# Patient Record
Sex: FEMALE | Race: WHITE | NOT HISPANIC OR LATINO | URBAN - METROPOLITAN AREA
[De-identification: names, ages, dates, MRNs, and addresses within clinical notes are randomized per-mention and may not be internally consistent; named-entity substitution may affect disease eponyms.]

---

## 2022-10-28 ENCOUNTER — TELEPHONE (OUTPATIENT)
Dept: OPHTHALMOLOGY | Facility: CLINIC | Age: 62
End: 2022-10-28

## 2022-10-28 NOTE — TELEPHONE ENCOUNTER
LVM for patient regarding scheduling with  for : Blepharoplasty-Per  Dr.Spencer López. Provided [facility] number for scheduling and also provided  as an option in [facility] that might be closer to home for patient.

## 2022-10-31 ENCOUNTER — TELEPHONE (OUTPATIENT)
Dept: OPHTHALMOLOGY | Facility: CLINIC | Age: 62
End: 2022-10-31

## 2022-10-31 NOTE — TELEPHONE ENCOUNTER
LVM for patient again regarding scheduling with  for : Blepharoplasty-Per  Dr.Spencer López. Provided [facility] and direct number for scheduling. Also, provided  as an option in [facility] that might be closer to home for patient.

## 2022-11-02 NOTE — TELEPHONE ENCOUNTER
FUTURE VISIT INFORMATION      FUTURE VISIT INFORMATION:    Date: 2/6/23    Time: 1:15pm    Location: [facility] [place]  REFERRAL INFORMATION:    Referring provider:  Dr Jose Miguel López     [facility] providers clinic[facility]     Reason for visit/diagnosis  Blepharoplasty    RECORDS REQUESTED FROM:       Clinic name Comments Records Status Imaging Status   [facility]  Request for recs sent 11/2- Fax: [phone]- resent 12/7, 1/25

## 2023-02-06 ENCOUNTER — OFFICE VISIT (OUTPATIENT)
Dept: OPHTHALMOLOGY | Facility: CLINIC | Age: 63
End: 2023-02-06
Payer: COMMERCIAL

## 2023-02-06 ENCOUNTER — PRE VISIT (OUTPATIENT)
Dept: OPHTHALMOLOGY | Facility: CLINIC | Age: 63
End: 2023-02-06

## 2023-02-06 DIAGNOSIS — H02.849 EDEMA OF EYELID, UNSPECIFIED LATERALITY: Primary | ICD-10-CM

## 2023-02-06 PROCEDURE — 96999 UNLISTED SPEC DERM SVC/PX: CPT | Performed by: OPHTHALMOLOGY

## 2023-02-06 PROCEDURE — 92285 EXTERNAL OCULAR PHOTOGRAPHY: CPT | Mod: GC | Performed by: OPHTHALMOLOGY

## 2023-02-06 PROCEDURE — 99203 OFFICE O/P NEW LOW 30 MIN: CPT | Mod: GC | Performed by: OPHTHALMOLOGY

## 2023-02-06 PROCEDURE — 92081 LIMITED VISUAL FIELD XM: CPT | Mod: GC | Performed by: OPHTHALMOLOGY

## 2023-02-06 RX ORDER — AMOXICILLIN 500 MG/1
CAPSULE ORAL
COMMUNITY
Start: 2023-01-25

## 2023-02-06 RX ORDER — CITALOPRAM HYDROBROMIDE 40 MG/1
40 TABLET ORAL
COMMUNITY
Start: 2022-04-05

## 2023-02-06 ASSESSMENT — VISUAL ACUITY
METHOD: SNELLEN - LINEAR
OS_SC: 20/25
OD_SC: 20/25
OS_SC+: -2

## 2023-02-06 ASSESSMENT — CONF VISUAL FIELD
OS_SUPERIOR_TEMPORAL_RESTRICTION: 0
OS_SUPERIOR_NASAL_RESTRICTION: 0
OD_SUPERIOR_TEMPORAL_RESTRICTION: 0
OD_INFERIOR_NASAL_RESTRICTION: 0
OD_INFERIOR_TEMPORAL_RESTRICTION: 0
OS_INFERIOR_NASAL_RESTRICTION: 0
OS_INFERIOR_TEMPORAL_RESTRICTION: 0
OD_NORMAL: 1
OD_SUPERIOR_NASAL_RESTRICTION: 0
OS_NORMAL: 1

## 2023-02-06 ASSESSMENT — TONOMETRY
OD_IOP_MMHG: 12
IOP_METHOD: ICARE
OS_IOP_MMHG: 10

## 2023-02-06 ASSESSMENT — EXTERNAL EXAM - RIGHT EYE: OD_EXAM: NORMAL

## 2023-02-06 NOTE — PROGRESS NOTES
Chief Complaints and History of Present Illnesses   Patient presents with     Droopy Eye Lid Evaluation     Chief Complaint(s) and History of Present Illness(es)     Droopy Eye Lid Evaluation    In right upper lid, right lower lid, left upper lid and left lower lid.    Associated signs and symptoms include Negative for trauma, eye pain,   redness, tearing and abnormal gait.  Pain was noted as 0/10.           Comments    Patient present regarding drooping lids each eye. Patient states lower   lids bother her more. Patient has tried many over the counter lotions and   micro needling and nothing seems to help.   Patient states warm and cold does nothing to aid with drooping.   LEIGHA Gould February 6, 2023 1:13 PM            pt referred for lower lid swelling. She saw Dr. Jose Miguel López about 1.5 years ago for lower lid swelling and was treated with filler which she feels has not helped and if anything the swelling is now worse. She feels the swelling is worse at the end of the day.       FUNCTIONAL COMPLAINTS RELATED TO DROOPY EYELIDS/BROWS:  Ryan Wiggins describes upper lids interfering with superior visual field and interfering with activities of daily living including reading, driving and watching television.     EXAM:     MRD1: Right eye 3   Left eye 3  Brow ptosis with brow resting below superior orbital rim yes left     VISUAL FIELD:  Right eye untaped:15 degrees Right eye taped:45 degrees  Left eye untaped:20 degrees Left eye taped:45 degrees    Right eye visual field improves by: 30 degrees  Left eye visual field improves by: 25 degrees        Assessment & Plan     Ryan Wiggins is a 62 year old female with the following diagnoses:   1. Edema of eyelid, unspecified laterality         Bilateral lower lid edema  - likely related to filler, but could also be festoons underlying filler-related edema  - recommend hylenex to dissolve filler  - follow up in 3-4  weeks for re-evaluation with possible additional  hylenex injection              Stephanie Austin MD  Oculoplastics Fellow    Attending Physician Attestation:  I have seen and examined this patient with the fellow .  I have confirmed and edited as necessary the chief complaint(s), history of present illness, review of systems, relevant history, and examination findings as documented by others.  I have personally reviewed the relevant tests, images, and reports as documented above.  I have confirmed and edited as necessary the assessment and plan and agree with this note.    - Antelmo Lucia MD 2:08 PM 2/6/2023    Today with Ryan Wiggins, I reviewed the indications, risks, benefits, and alternatives of the proposed surgical procedure including, but not limited to, failure obtain the desired result  and need for additional surgery, bleeding, infection, loss of vision, loss of the eye, and the remote possibility of permanent damage to any organ system or death with the use of anesthesia.  I provided multiple opportunities for the questions, answered all questions to the best of my ability, and confirmed that my answers and my discussion were understood.     - Antelmo Lucia MD 2:09 PM 2/6/2023

## 2023-02-06 NOTE — NURSING NOTE
Chief Complaints and History of Present Illnesses   Patient presents with     Droopy Eye Lid Evaluation     Chief Complaint(s) and History of Present Illness(es)     Droopy Eye Lid Evaluation            Laterality: right upper lid, right lower lid, left upper lid and left lower lid    Associated signs and symptoms: Negative for trauma, eye pain, redness, tearing and abnormal gait    Pain scale: 0/10          Comments    Patient present regarding drooping lids each eye. Patient states lower lids bother her more. Patient has tried many over the counter lotions and micro needling and nothing seems to help.   Patient states warm and cold does nothing to aid with drooping.   Jennifer Magallanes, LEIGHA February 6, 2023 1:13 PM

## 2023-02-06 NOTE — LETTER
2023         RE:  :  MRN: Ryan Wiggins  1960  [id number]     Dear Dr. Pepper Gutierrez,    Thank you for asking me to see your patient, Ryan Wiggins, for an oculoplastic   consultation.  My assessment and plan are below.  For further details, please see my attached clinic note.      Assessment & Plan     Ryan Wiggins is a 62 year old female with the following diagnoses:   1. Edema of eyelid, unspecified laterality         Bilateral lower lid edema  - likely related to filler, but could also be festoons underlying filler-related edema  - recommend Hylenex to dissolve filler  - follow up in 3-4  weeks for re-evaluation with possible additional hylenex injection            Again, thank you for allowing me to participate in the care of your patient.      Sincerely,    Antelmo Lucia MD  Department of Ophthalmology and Visual Neurosciences  [facility]    CC: PEPPER GUTIERREZ  [address] [address]  Via Fax: [phone]

## 2023-02-12 ENCOUNTER — HEALTH MAINTENANCE LETTER (OUTPATIENT)
Age: 63
End: 2023-02-12

## 2023-03-06 ENCOUNTER — OFFICE VISIT (OUTPATIENT)
Dept: OPHTHALMOLOGY | Facility: CLINIC | Age: 63
End: 2023-03-06
Payer: COMMERCIAL

## 2023-03-06 DIAGNOSIS — H02.849 EDEMA OF EYELID, UNSPECIFIED LATERALITY: Primary | ICD-10-CM

## 2023-03-06 PROCEDURE — 99213 OFFICE O/P EST LOW 20 MIN: CPT | Mod: GC | Performed by: OPHTHALMOLOGY

## 2023-03-06 PROCEDURE — 96999 UNLISTED SPEC DERM SVC/PX: CPT | Performed by: OPHTHALMOLOGY

## 2023-03-06 ASSESSMENT — CONF VISUAL FIELD
OD_NORMAL: 1
OD_INFERIOR_NASAL_RESTRICTION: 0
OD_SUPERIOR_TEMPORAL_RESTRICTION: 0
OD_INFERIOR_TEMPORAL_RESTRICTION: 0
METHOD: COUNTING FINGERS
OS_NORMAL: 1
OS_SUPERIOR_TEMPORAL_RESTRICTION: 0
OS_INFERIOR_NASAL_RESTRICTION: 0
OD_SUPERIOR_NASAL_RESTRICTION: 0
OS_INFERIOR_TEMPORAL_RESTRICTION: 0
OS_SUPERIOR_NASAL_RESTRICTION: 0

## 2023-03-06 ASSESSMENT — VISUAL ACUITY
OS_SC+: -2
METHOD: SNELLEN - LINEAR
OD_SC: 20/20
OD_SC+: -1
OS_SC: 20/20

## 2023-03-06 ASSESSMENT — TONOMETRY
IOP_METHOD: ICARE
OD_IOP_MMHG: 12
OS_IOP_MMHG: 11

## 2023-03-06 ASSESSMENT — EXTERNAL EXAM - RIGHT EYE: OD_EXAM: NORMAL

## 2023-03-06 NOTE — PROGRESS NOTES
Chief Complaints and History of Present Illnesses   Patient presents with     Droopy Eye Lid Follow-Up     Chief Complaint(s) and History of Present Illness(es)     Droopy Eye Lid Follow-Up    In right upper lid, right lower lid, left upper lid and left lower lid.    Associated signs and symptoms include Negative for imbalance, difficulty   chewing, chin-up position and neck tilt.           Comments    Patient wants upper and lower lids done. Patient had filler done Feb 6th.   Patient states she sees no difference. Patient would like to move forward   with surgery if possible.  LEIGHA Gould March 6, 2023 12:50 PM                     Assessment & Plan     Ryan Wiggins is a 62 year old female with the following diagnoses:   1. Edema of eyelid, unspecified laterality         Bilateral lower lid edema  - likely related to filler, but could also be festoons underlying filler-related edema  - recommend repeat hylenex today as there is persistent filler  - follow up in 3-4  weeks for re-evaluation with possible additional hylenex injection          Stephanie Austin MD  Oculoplastics Fellow    Attending Physician Attestation:  I have seen and examined this patient with the fellow .  I have confirmed and edited as necessary the chief complaint(s), history of present illness, review of systems, relevant history, and examination findings as documented by others.  I have personally reviewed the relevant tests, images, and reports as documented above.  I have confirmed and edited as necessary the assessment and plan and agree with this note.    - Antelmo Lucia MD 1:23 PM 3/6/2023    Today with Ryan Wiggins, I reviewed the indications, risks, benefits, and alternatives of the proposed surgical procedure including, but not limited to, failure obtain the desired result  and need for additional surgery, bleeding, infection, loss of vision, loss of the eye, and the remote possibility of permanent damage to any organ  system or death with the use of anesthesia.  I provided multiple opportunities for the questions, answered all questions to the best of my ability, and confirmed that my answers and my discussion were understood.     - Antelmo Lucia MD 1:23 PM 3/6/2023

## 2023-03-06 NOTE — NURSING NOTE
Chief Complaints and History of Present Illnesses   Patient presents with     Droopy Eye Lid Follow-Up     Chief Complaint(s) and History of Present Illness(es)     Droopy Eye Lid Follow-Up            Laterality: right upper lid, right lower lid, left upper lid and left lower lid    Associated signs and symptoms: Negative for imbalance, difficulty chewing, chin-up position and neck tilt          Comments    Patient wants upper and lower lids done. Patient had filler done Feb 6th. Patient states she sees no difference. Patient would like to move forward with surgery if possible.  Jennifer Magallanes, LEIGHA March 6, 2023 12:50 PM

## 2023-03-27 ENCOUNTER — OFFICE VISIT (OUTPATIENT)
Dept: OPHTHALMOLOGY | Facility: CLINIC | Age: 63
End: 2023-03-27
Payer: COMMERCIAL

## 2023-03-27 DIAGNOSIS — H02.831 DERMATOCHALASIS OF BOTH UPPER EYELIDS: ICD-10-CM

## 2023-03-27 DIAGNOSIS — H02.849 EDEMA OF EYELID, UNSPECIFIED LATERALITY: Primary | ICD-10-CM

## 2023-03-27 DIAGNOSIS — H02.834 DERMATOCHALASIS OF BOTH UPPER EYELIDS: ICD-10-CM

## 2023-03-27 PROCEDURE — 96999 UNLISTED SPEC DERM SVC/PX: CPT | Performed by: OPHTHALMOLOGY

## 2023-03-27 PROCEDURE — 99207 PR BUNDLED PROCEDURE IN GLOBAL PKG: CPT | Mod: GC | Performed by: OPHTHALMOLOGY

## 2023-03-27 ASSESSMENT — CONF VISUAL FIELD
OS_INFERIOR_NASAL_RESTRICTION: 0
OD_NORMAL: 1
OD_INFERIOR_NASAL_RESTRICTION: 0
OS_INFERIOR_TEMPORAL_RESTRICTION: 0
OD_SUPERIOR_NASAL_RESTRICTION: 0
METHOD: COUNTING FINGERS
OS_NORMAL: 1
OD_SUPERIOR_TEMPORAL_RESTRICTION: 0
OD_INFERIOR_TEMPORAL_RESTRICTION: 0
OS_SUPERIOR_NASAL_RESTRICTION: 0
OS_SUPERIOR_TEMPORAL_RESTRICTION: 0

## 2023-03-27 ASSESSMENT — VISUAL ACUITY
OS_SC: 20/20
OD_SC: 20/25
METHOD: SNELLEN - LINEAR

## 2023-03-27 ASSESSMENT — MARGIN REFLEX DISTANCE
OD_MRD1: 4
OS_MRD1: 3

## 2023-03-27 ASSESSMENT — EXTERNAL EXAM - RIGHT EYE: OD_EXAM: NORMAL

## 2023-03-27 ASSESSMENT — TONOMETRY
OS_IOP_MMHG: 11
IOP_METHOD: ICARE
OD_IOP_MMHG: 13

## 2023-03-27 NOTE — PROGRESS NOTES
Chief Complaints and History of Present Illnesses   Patient presents with     Follow Up     Edema of eyelid     Chief Complaint(s) and History of Present Illness(es)     Follow Up    Since onset it is gradually improving.  Associated symptoms include   Negative for dryness, eye pain, redness and tearing.  Treatments tried   include no treatments.  Pain was noted as 0/10. Additional comments: Edema   of eyelid           Comments    She state that her lower lid puffiness is a bit improved since her last   exam.  Patient denies having any eye discomfort.    Madison Quarles, COT 11:46 AM  March 27, 2023          Assessment & Plan     Ryan Wiggins is a 62 year old female with the following diagnoses:   1. Edema of eyelid, unspecified laterality         Bilateral lower lid edema  - filler-related edema, improving with hylenex. She may also have festoons underlying the filler-related edema  - less edema compared to last visit  - consider repeat hyelnex today for residual filler    PLAN  Bilateral upper blepharoplasty - skin  Bilateral lower lid Blepharoplasty - TCFx + SP + orbic            Stephanie Austin MD  Oculoplastic Surgery Fellow    Attending Physician Attestation:  I have seen and examined this patient with the fellow .  I have confirmed and edited as necessary the chief complaint(s), history of present illness, review of systems, relevant history, and examination findings as documented by others.  I have personally reviewed the relevant tests, images, and reports as documented above.  I have confirmed and edited as necessary the assessment and plan and agree with this note.    - Antelmo Lucia MD 12:04 PM 3/27/2023      Today with Ryan Wiggins, I reviewed the indications, risks, benefits, and alternatives of the proposed surgical procedure including, but not limited to, failure obtain the desired result  and need for additional surgery, bleeding, infection, loss of vision, loss of the eye, and the remote  possibility of permanent damage to any organ system or death with the use of anesthesia.  I provided multiple opportunities for the questions, answered all questions to the best of my ability, and confirmed that my answers and my discussion were understood.     - Antelmo Lucia MD 12:25 PM 3/27/2023

## 2023-03-27 NOTE — NURSING NOTE
Chief Complaints and History of Present Illnesses   Patient presents with     Follow Up     Edema of eyelid     Chief Complaint(s) and History of Present Illness(es)     Follow Up            Course: gradually improving    Associated symptoms: Negative for dryness, eye pain, redness and tearing    Treatments tried: no treatments    Pain scale: 0/10    Comments: Edema of eyelid          Comments    She state that her lower lid puffiness is a bit improved since her last exam.  Patient denies having any eye discomfort.    Madison Quarles, COT 11:46 AM  March 27, 2023

## 2023-03-28 ENCOUNTER — TELEPHONE (OUTPATIENT)
Dept: OPHTHALMOLOGY | Facility: CLINIC | Age: 63
End: 2023-03-28
Payer: COMMERCIAL

## 2023-03-28 NOTE — TELEPHONE ENCOUNTER
Met with patient to schedule surgery with Dr Lucia    Surgery was scheduled on 5/3 at [facility]  Patient will have H&P at No Ref-Primary, Physician     Post-Op visit was scheduled on 5/8  Patient is aware a / is needed day of surgery.   Surgery packet was given, patient has my direct contact information for any further questions.

## 2023-05-01 ENCOUNTER — ANESTHESIA EVENT (OUTPATIENT)
Dept: SURGERY | Facility: AMBULATORY SURGERY CENTER | Age: 63
End: 2023-05-01

## 2023-05-03 ENCOUNTER — HOSPITAL ENCOUNTER (OUTPATIENT)
Facility: AMBULATORY SURGERY CENTER | Age: 63
Discharge: HOME | End: 2023-05-03
Attending: OPHTHALMOLOGY

## 2023-05-03 ENCOUNTER — ANESTHESIA (OUTPATIENT)
Dept: SURGERY | Facility: AMBULATORY SURGERY CENTER | Age: 63
End: 2023-05-03

## 2023-05-03 VITALS
WEIGHT: 112 LBS | HEART RATE: 72 BPM | DIASTOLIC BLOOD PRESSURE: 63 MMHG | HEIGHT: 64 IN | BODY MASS INDEX: 19.12 KG/M2 | TEMPERATURE: 97 F | OXYGEN SATURATION: 98 % | SYSTOLIC BLOOD PRESSURE: 113 MMHG | RESPIRATION RATE: 21 BRPM

## 2023-05-03 DIAGNOSIS — H02.834 DERMATOCHALASIS OF BOTH UPPER EYELIDS: ICD-10-CM

## 2023-05-03 DIAGNOSIS — H02.831 DERMATOCHALASIS OF BOTH UPPER EYELIDS: ICD-10-CM

## 2023-05-03 PROCEDURE — 360N000063: Mod: DBP

## 2023-05-03 PROCEDURE — 370N000013: Mod: DBP

## 2023-05-03 PROCEDURE — 96999 UNLISTED SPEC DERM SVC/PX: CPT | Performed by: OPHTHALMOLOGY

## 2023-05-03 RX ORDER — DEXAMETHASONE SODIUM PHOSPHATE 4 MG/ML
INJECTION, SOLUTION INTRA-ARTICULAR; INTRALESIONAL; INTRAMUSCULAR; INTRAVENOUS; SOFT TISSUE PRN
Status: DISCONTINUED | OUTPATIENT
Start: 2023-05-03 | End: 2023-05-03

## 2023-05-03 RX ORDER — OXYCODONE HYDROCHLORIDE 5 MG/1
5 TABLET ORAL
Status: COMPLETED | OUTPATIENT
Start: 2023-05-03 | End: 2023-05-03

## 2023-05-03 RX ORDER — OXYCODONE HYDROCHLORIDE 5 MG/1
5 TABLET ORAL EVERY 6 HOURS PRN
Qty: 10 TABLET | Refills: 0 | Status: SHIPPED | OUTPATIENT
Start: 2023-05-03 | End: 2023-05-06

## 2023-05-03 RX ORDER — LABETALOL HYDROCHLORIDE 5 MG/ML
10 INJECTION, SOLUTION INTRAVENOUS
Status: DISCONTINUED | OUTPATIENT
Start: 2023-05-03 | End: 2023-05-03 | Stop reason: HOSPADM

## 2023-05-03 RX ORDER — NEOMYCIN POLYMYXIN B SULFATES AND DEXAMETHASONE 3.5; 10000; 1 MG/ML; [USP'U]/ML; MG/ML
1 SUSPENSION/ DROPS OPHTHALMIC 4 TIMES DAILY
Qty: 3 ML | Refills: 0 | Status: SHIPPED | OUTPATIENT
Start: 2023-05-03

## 2023-05-03 RX ORDER — ERYTHROMYCIN 5 MG/G
OINTMENT OPHTHALMIC
Qty: 3.5 G | Refills: 0 | Status: SHIPPED | OUTPATIENT
Start: 2023-05-03

## 2023-05-03 RX ORDER — ONDANSETRON 2 MG/ML
4 INJECTION INTRAMUSCULAR; INTRAVENOUS EVERY 30 MIN PRN
Status: DISCONTINUED | OUTPATIENT
Start: 2023-05-03 | End: 2023-05-03 | Stop reason: HOSPADM

## 2023-05-03 RX ORDER — SODIUM CHLORIDE, SODIUM LACTATE, POTASSIUM CHLORIDE, CALCIUM CHLORIDE 600; 310; 30; 20 MG/100ML; MG/100ML; MG/100ML; MG/100ML
INJECTION, SOLUTION INTRAVENOUS CONTINUOUS
Status: DISCONTINUED | OUTPATIENT
Start: 2023-05-03 | End: 2023-05-03 | Stop reason: HOSPADM

## 2023-05-03 RX ORDER — FENTANYL CITRATE 50 UG/ML
INJECTION, SOLUTION INTRAMUSCULAR; INTRAVENOUS PRN
Status: DISCONTINUED | OUTPATIENT
Start: 2023-05-03 | End: 2023-05-03

## 2023-05-03 RX ORDER — TETRACAINE HYDROCHLORIDE 5 MG/ML
SOLUTION OPHTHALMIC PRN
Status: DISCONTINUED | OUTPATIENT
Start: 2023-05-03 | End: 2023-05-03 | Stop reason: HOSPADM

## 2023-05-03 RX ORDER — HYDROMORPHONE HYDROCHLORIDE 1 MG/ML
0.4 INJECTION, SOLUTION INTRAMUSCULAR; INTRAVENOUS; SUBCUTANEOUS EVERY 5 MIN PRN
Status: DISCONTINUED | OUTPATIENT
Start: 2023-05-03 | End: 2023-05-03 | Stop reason: HOSPADM

## 2023-05-03 RX ORDER — LIDOCAINE 40 MG/G
CREAM TOPICAL
Status: DISCONTINUED | OUTPATIENT
Start: 2023-05-03 | End: 2023-05-03 | Stop reason: HOSPADM

## 2023-05-03 RX ORDER — FENTANYL CITRATE 50 UG/ML
50 INJECTION, SOLUTION INTRAMUSCULAR; INTRAVENOUS EVERY 5 MIN PRN
Status: DISCONTINUED | OUTPATIENT
Start: 2023-05-03 | End: 2023-05-03 | Stop reason: HOSPADM

## 2023-05-03 RX ORDER — HYDROMORPHONE HYDROCHLORIDE 1 MG/ML
0.2 INJECTION, SOLUTION INTRAMUSCULAR; INTRAVENOUS; SUBCUTANEOUS EVERY 5 MIN PRN
Status: DISCONTINUED | OUTPATIENT
Start: 2023-05-03 | End: 2023-05-03 | Stop reason: HOSPADM

## 2023-05-03 RX ORDER — ERYTHROMYCIN 5 MG/G
OINTMENT OPHTHALMIC PRN
Status: DISCONTINUED | OUTPATIENT
Start: 2023-05-03 | End: 2023-05-03 | Stop reason: HOSPADM

## 2023-05-03 RX ORDER — OXYCODONE HYDROCHLORIDE 5 MG/1
10 TABLET ORAL
Status: DISCONTINUED | OUTPATIENT
Start: 2023-05-03 | End: 2023-05-04 | Stop reason: HOSPADM

## 2023-05-03 RX ORDER — ONDANSETRON 4 MG/1
4 TABLET, ORALLY DISINTEGRATING ORAL EVERY 30 MIN PRN
Status: DISCONTINUED | OUTPATIENT
Start: 2023-05-03 | End: 2023-05-03 | Stop reason: HOSPADM

## 2023-05-03 RX ORDER — LIDOCAINE HYDROCHLORIDE AND EPINEPHRINE 10; 10 MG/ML; UG/ML
INJECTION, SOLUTION INFILTRATION; PERINEURAL PRN
Status: DISCONTINUED | OUTPATIENT
Start: 2023-05-03 | End: 2023-05-03 | Stop reason: HOSPADM

## 2023-05-03 RX ORDER — ONDANSETRON 2 MG/ML
INJECTION INTRAMUSCULAR; INTRAVENOUS PRN
Status: DISCONTINUED | OUTPATIENT
Start: 2023-05-03 | End: 2023-05-03

## 2023-05-03 RX ORDER — FENTANYL CITRATE 50 UG/ML
25 INJECTION, SOLUTION INTRAMUSCULAR; INTRAVENOUS EVERY 5 MIN PRN
Status: DISCONTINUED | OUTPATIENT
Start: 2023-05-03 | End: 2023-05-03 | Stop reason: HOSPADM

## 2023-05-03 RX ORDER — ACETAMINOPHEN 325 MG/1
975 TABLET ORAL ONCE
Status: COMPLETED | OUTPATIENT
Start: 2023-05-03 | End: 2023-05-03

## 2023-05-03 RX ORDER — LIDOCAINE HYDROCHLORIDE 20 MG/ML
INJECTION, SOLUTION INFILTRATION; PERINEURAL PRN
Status: DISCONTINUED | OUTPATIENT
Start: 2023-05-03 | End: 2023-05-03

## 2023-05-03 RX ORDER — PROPOFOL 10 MG/ML
INJECTION, EMULSION INTRAVENOUS PRN
Status: DISCONTINUED | OUTPATIENT
Start: 2023-05-03 | End: 2023-05-03

## 2023-05-03 RX ADMIN — FENTANYL CITRATE 50 MCG: 50 INJECTION, SOLUTION INTRAMUSCULAR; INTRAVENOUS at 13:04

## 2023-05-03 RX ADMIN — FENTANYL CITRATE 25 MCG: 50 INJECTION, SOLUTION INTRAMUSCULAR; INTRAVENOUS at 14:12

## 2023-05-03 RX ADMIN — LIDOCAINE HYDROCHLORIDE 60 MG: 20 INJECTION, SOLUTION INFILTRATION; PERINEURAL at 12:43

## 2023-05-03 RX ADMIN — FENTANYL CITRATE 25 MCG: 50 INJECTION, SOLUTION INTRAMUSCULAR; INTRAVENOUS at 13:59

## 2023-05-03 RX ADMIN — OXYCODONE HYDROCHLORIDE 5 MG: 5 TABLET ORAL at 14:18

## 2023-05-03 RX ADMIN — SODIUM CHLORIDE, SODIUM LACTATE, POTASSIUM CHLORIDE, CALCIUM CHLORIDE: 600; 310; 30; 20 INJECTION, SOLUTION INTRAVENOUS at 11:02

## 2023-05-03 RX ADMIN — FENTANYL CITRATE 50 MCG: 50 INJECTION, SOLUTION INTRAMUSCULAR; INTRAVENOUS at 13:08

## 2023-05-03 RX ADMIN — ACETAMINOPHEN 975 MG: 325 TABLET ORAL at 11:00

## 2023-05-03 RX ADMIN — PROPOFOL 180 MCG/KG/MIN: 10 INJECTION, EMULSION INTRAVENOUS at 12:47

## 2023-05-03 RX ADMIN — PROPOFOL 150 MG: 10 INJECTION, EMULSION INTRAVENOUS at 12:45

## 2023-05-03 RX ADMIN — FENTANYL CITRATE 25 MCG: 50 INJECTION, SOLUTION INTRAMUSCULAR; INTRAVENOUS at 14:07

## 2023-05-03 RX ADMIN — ONDANSETRON 4 MG: 2 INJECTION INTRAMUSCULAR; INTRAVENOUS at 13:29

## 2023-05-03 RX ADMIN — DEXAMETHASONE SODIUM PHOSPHATE 4 MG: 4 INJECTION, SOLUTION INTRA-ARTICULAR; INTRALESIONAL; INTRAMUSCULAR; INTRAVENOUS; SOFT TISSUE at 13:29

## 2023-05-03 RX ADMIN — FENTANYL CITRATE 25 MCG: 50 INJECTION, SOLUTION INTRAMUSCULAR; INTRAVENOUS at 14:02

## 2023-05-03 NOTE — OP NOTE
PREOPERATIVE DIAGNOSES:   1. Bilateral upper eyelid dermatochalasis.  2. Bilateral lower eyelid steatochalasis and dermatochalasis.   POSTOPERATIVE DIAGNOSES:   1. Bilateral upper eyelid dermatochalasis.   2. Bilateral lower eyelid steatochalasis and dermatochalasis.   PROCEDURES:   1. Bilateral upper blepharoplasty.  2. Bilateral lower eyelid cosmetic blepharoplasty.   SURGEON: Antelmo Lucia MD  ASSISTANT: Stephanie Austin MD and Daria Bullock MD  ANESTHESIA: General with local infiltration of  1% lidocaine with epinephrine.   COMPLICATIONS: None.   ESTIMATED BLOOD LOSS: Less than 5 mL.   HISTORY: Ryan Wiggins presented with upper lid drooping secondary to  dermatochalasis interfering with her vision. Ryna Wiggins also had lower lid dermatochalasis and steatochalasis and desired cosmetic correction. After risks, benefits and alternatives to the proposed procedure were explained, informed consent was obtained.   DESCRIPTION OF PROCEDURE: Ryan Wiggins  was brought to the operating room and placed supine on the operating table. General anesthesia was induced. The upper lid crease and excess upper eyelid skin was marked with a marking pen and the upper and lower eyelids infiltrated with local anesthetic.  The area was prepped and draped in the typical sterile fashion for oculoplastic surgery. Attention was directed to the left side. Lower lid was everted. Conjunctival incision was made with monopolar cautery. The Abel lid plate was used to protect the globe during this procedure. A Desmarres retractor was placed and dissection carried down through the lower lid retractors. The conjunctiva and lower lid retractors were tagged with a 4-0 silk suture to the drape superiorly. Dissection was carried over each fat pad. Each fat pad was identified and resected with monopolar cautery. Hemostasis was obtained. The inferior oblique muscle was identified and avoided. The silk suture was removed. Attention directed  to the right lower lid where the same procedure was performed. We then performed the pinch procedure on the skin of the right lower lid using the green fixation forcep. The skin was excised with the Scottie scissors.  Hemostasis was obtained with the high temperature cautery. A lateral orbicularis flap was created and secured to the lateral orbital rim with 5-0 Vicryl.  The skin was closed with running 6-0 plain fast-absorbing gut suture. Attention was directed to the left lower lid where the pinch excision was performed as well.  Attention was directed to the right upper lid. Skin was incised following marked lines. Skin flap was excised with a high temperature cautery. A row of cautery was placed in the orbicularis along the inferior incision line.  Hemostasis was obtained and the skin closed with running 6-0 plain gut suture. Attention was directed to the left side where the same procedure was performed. Erythromycin ointment was applied to the incision in the eyes. The patient tolerated the procedure well and left the operating room in stable condition.   HO ALVAREZ MD

## 2023-05-03 NOTE — ANESTHESIA PREPROCEDURE EVALUATION
Anesthesia Pre-Procedure Evaluation    Patient: Ryan Wiggins   MRN: [id number] : 1960        Procedure : Procedure(s):  COSMETIC BILATERAL UPPER AND LOWER BLEPHAROPLASTY          History reviewed. No pertinent past medical history.   History reviewed. No pertinent surgical history.   No Known Allergies   Social History     Tobacco Use     Smoking status: Never     Smokeless tobacco: Never   Vaping Use     Vaping status: Not on file   Substance Use Topics     Alcohol use: Not on file      Wt Readings from Last 1 Encounters:   23 50.8 kg (112 lb)        Anesthesia Evaluation            ROS/MED HX  ENT/Pulmonary:  - neg pulmonary ROS     Neurologic:  - neg neurologic ROS     Cardiovascular:  - neg cardiovascular ROS     METS/Exercise Tolerance:     Hematologic:  - neg hematologic  ROS     Musculoskeletal:       GI/Hepatic:  - neg GI/hepatic ROS     Renal/Genitourinary:  - neg Renal ROS     Endo:  - neg endo ROS     Psychiatric/Substance Use:  - neg psychiatric ROS     Infectious Disease:  - neg infectious disease ROS     Malignancy:  - neg malignancy ROS     Other:               OUTSIDE LABS:  CBC: No results found for: WBC, HGB, HCT, PLT  BMP: No results found for: NA, POTASSIUM, CHLORIDE, CO2, BUN, CR, GLC  COAGS: No results found for: PTT, INR, FIBR  POC: No results found for: BGM, HCG, HCGS  HEPATIC: No results found for: ALBUMIN, PROTTOTAL, ALT, AST, GGT, ALKPHOS, BILITOTAL, BILIDIRECT, RAJNI  OTHER: No results found for: PH, LACT, A1C, LARRY, PHOS, MAG, LIPASE, AMYLASE, TSH, T4, T3, CRP, SED    Anesthesia Plan    ASA Status:  1      Anesthesia Type: General.              Consents    Anesthesia Plan(s) and associated risks, benefits, and realistic alternatives discussed. Questions answered and patient/representative(s) expressed understanding.     - Discussed: Risks, Benefits and Alternatives for BOTH SEDATION and the PROCEDURE were discussed     - Discussed with:  Patient      - Extended  Intubation/Ventilatory Support Discussed: No.      - Patient is DNR/DNI Status: No    Use of blood products discussed: No .     Postoperative Care    Pain management: IV analgesics, Oral pain medications.   PONV prophylaxis: Ondansetron (or other 5HT-3), Dexamethasone or Solumedrol     Comments:                Pilo Mason MD, MD

## 2023-05-03 NOTE — BRIEF OP NOTE
[facility]    Brief Operative Note    Pre-operative diagnosis: Dermatochalasis of both upper eyelids [H02.831, H02.834]  Post-operative diagnosis Same as pre-operative diagnosis    Procedure: Procedure(s):  COSMETIC BILATERAL UPPER AND LOWER BLEPHAROPLASTY  Surgeon: Surgeon(s) and Role:     * Antelmo Lucia MD - Primary     * Christina Bullock MD - Resident - Assisting     * Stephanie Austin MD - Fellow - Assisting  Anesthesia: General   Estimated Blood Loss: Minimal    Drains: None  Specimens: * No specimens in log *  Findings:   as expected.  Complications: None.  Implants: * No implants in log *

## 2023-05-03 NOTE — ANESTHESIA POSTPROCEDURE EVALUATION
Patient: Ryan Wiggins    Procedure: Procedure(s):  COSMETIC BILATERAL UPPER AND LOWER BLEPHAROPLASTY       Anesthesia Type:  General    Note:  Disposition: Outpatient   Postop Pain Control: Uneventful            Sign Out: Well controlled pain   PONV: No   Neuro/Psych: Uneventful            Sign Out: Acceptable/Baseline neuro status   Airway/Respiratory: Uneventful            Sign Out: Acceptable/Baseline resp. status   CV/Hemodynamics: Uneventful            Sign Out: Acceptable CV status; No obvious hypovolemia; No obvious fluid overload   Other NRE: NONE   DID A NON-ROUTINE EVENT OCCUR? No           Last vitals:  Vitals Value Taken Time   BP     Temp     Pulse     Resp     SpO2 100 % 05/03/23 1338   Vitals shown include unvalidated device data.    Electronically Signed By: Pilo Mason MD, MD  May 3, 2023  1:39 PM

## 2023-05-03 NOTE — ANESTHESIA CARE TRANSFER NOTE
Patient: Ryan Wiggins    Procedure: Procedure(s):  COSMETIC BILATERAL UPPER AND LOWER BLEPHAROPLASTY       Diagnosis: Dermatochalasis of both upper eyelids [H02.831, H02.834]  Diagnosis Additional Information: No value filed.    Anesthesia Type:   General     Note:    Oropharynx: oropharynx clear of all foreign objects and spontaneously breathing  Level of Consciousness: awake  Oxygen Supplementation: face mask  Level of Supplemental Oxygen (L/min / FiO2): 4  Independent Airway: airway patency satisfactory and stable  Dentition: dentition unchanged  Vital Signs Stable: post-procedure vital signs reviewed and stable  Report to RN Given: handoff report given  Patient transferred to: PACU    Handoff Report: Identifed the Patient, Identified the Reponsible Provider, Reviewed the pertinent medical history, Discussed the surgical course, Reviewed Intra-OP anesthesia mangement and issues during anesthesia, Set expectations for post-procedure period and Allowed opportunity for questions and acknowledgement of understanding      Vitals:  Vitals Value Taken Time   /53 05/03/23 1339   Temp 97.2    Pulse 82 05/03/23 1340   Resp 14 05/03/23 1340   SpO2 100 % 05/03/23 1340   Vitals shown include unvalidated device data.    Electronically Signed By: NIKKI Peterson CRNA  May 3, 2023  1:41 PM

## 2023-05-03 NOTE — DISCHARGE INSTRUCTIONS
Post-operative Instructions    Ophthalmic Plastic and Reconstructive Surgery  Antelmo Lucia M.D.  Stephanie Austin M.D.    All instructions apply to the operated eye(s) or eyelid(s)      What to expect after surgery:  There will be some swelling, bruising, and likely a black eye (even into the lower eyelids and cheeks). Also expect crusting and discharge from the eye and/or incisions.   A small amount of surface bleeding is normal for the first 48 hours after surgery.  You may notice some bloody tears for the first few days after surgery. This is normal.  Your eye(s) and eyelid(s) may be painful and tender. This is normal after surgery. Use the pain medication as prescribed. If your pain does not improve despite the medication, contact the office.    Wound care and personal care:     Tape on the lower lids for lower cosmetic blepharoplasty can get wet and it is safe to shower with the tape on the lower eyelids. The tape will be removed at one week. If it falls off, do not replace.   Apply ice compresses 15 minutes on 15 minutes off while awake for the first 2 days after surgery, then switch to warm compresses 4 times a day until seen by your physician.   For warm packs you can place a cup of dry uncooked rice in a clean cotton sock. Place sock in microwave 30 seconds to one minute. Next place the warm sock into a plastic bag and wrap the bag with clean warm wet washcloth and place over operated eye.    You may shower or wash your hair the day after surgery. Do not bathe or go swimming for 1 week to prevent contamination of your wounds.  Do not apply make-up to the eyes or eyelids for 2 weeks after surgery.    Activity restrictions and driving:  Avoid heavy lifting, bending, exercise or strenuous activity for 1 week after surgery.  You may resume other activities and return to work as tolerated.  You may not resume driving until have you stopped using narcotic pain medications(such as Norco, Percocet, Tylenol  #3).    Medications:  Restart all your regular home medications and eye drops today. If you take Plavix or Aspirin on a regular basis, wait for 3 days after your surgery before restarting these in order to decrease the risk of bleeding complications.  Avoid aspirin and aspirin-like medications (Motrin, Aleve, Ibuprofen, [place] etc) for 5 days to reduce the risk of bleeding. You may take Tylenol (acetaminophen) for pain.  In addition to your home medications, take the following post-operative medications as prescribed by your physician:  Apply antibiotic ointment (erythromycin) to all sutures three times a day, and into the operated eye(s) at night.   Instill eye drops (Maxitrol) four times a day until the bottle finished.   Take scheduled extra strength Tylenol for pain.  You may take 1 to 2 pain pills (norco or oxycodone as prescribed) as needed for breakthrough pain up to every 6 hours.  The pain pills may make you drowsy. You must not drive a car, operate heavy machinery or drink alcohol while taking them.  The pain pills may cause constipation and nausea. Take them with some food to prevent a stomach upset. If you continue to experience nausea, call your physician.    WARNING: All the prescription pain medications listed above contain Tylenol (acetaminophen). You must not take more than 4,000 mg of acetaminophen per 24-hour period. This is equivalent to 6 tablets of Darvocet, 8 tablets of Vicodin, or 12 tablets of Norco, Percocet or Tylenol #3. If you take other over-the-counter medications containing acetaminophen, you must take the amount of acetaminophen into account and reduce the number of prescribed pain pills accordingly.    Contact information and follow-up:  Return to the [facility] for a follow-up appointment with your physician as  scheduled. If no appointment has been scheduled, call [phone] for an  appointment with Dr. Lucia within 1 to 2 weeks from your date of surgery.  -     Please  email a few photos of your eye(s) or other operative site(s) to [email or web address] prior to your follow up visit.    For severe pain, bleeding, or loss of vision, call the Eye Clinic at [phone].  After hours or on weekends and holidays, call [phone] and ask to speak with the ophthalmologist on call.        [facility]  Home Care Following Anesthesia  For 24 hours after surgery:  Get plenty of rest.  A responsible adult must stay with you for at least 24 hours after you leave the surgery center.  Do not drive or use heavy equipment.  If you have weakness or tingling, don't drive or use heavy equipment until this feeling goes away.   Do not drink alcohol.   Avoid strenuous or risky activities.  Ask for help when climbing stairs.  You may feel lightheaded.  IF so, sit for a few minutes before standing.  Have someone help you get up.   If you have nausea (feel sick to your stomach): Drink only clear liquids such as apple juice, ginger ale, broth or 7-Up.  Rest may also help.  Be sure to drink enough fluids.  Move to a regular diet as you feel able.   You may have a slight fever.  Call the doctor if your fever is over 100 F (37.7 C) (taken under the tongue) or lasts longer than 24 hours.  You may have a dry mouth, a sore throat, muscle aches or trouble sleeping. These should go away after 24 hours.  Do not make important or legal decisions.   It is recommended to avoid smoking.               Tips for taking pain medications  To get the best pain relief possible, remember these points:  Take pain medications as directed, before pain becomes severe.  Pain medication can upset your stomach: taking it with food may help.  Constipation is a common side effect of pain medication. Drink plenty of  fluids.  Eat foods high in fiber. Take a stool softener if recommended by your doctor or pharmacist.  Do not drink alcohol, drive or operate machinery while taking pain  medications.  Ask about other ways to control pain, such as with heat, ice or relaxation.    Tylenol/Acetaminophen Consumption  To help encourage the safe use of acetaminophen, the makers of TYLENOL  have lowered the maximum daily dose for single-ingredient Extra Strength TYLENOL  (acetaminophen) products sold in the [place] from 8 pills per day (4,000 mg) to 6 pills per day (3,000 mg). The dosing interval has also changed from 2 pills every 4-6 hours to 2 pills every 6 hours.  If you feel your pain relief is insufficient, you may take Tylenol/Acetaminophen in addition to your narcotic pain medication.   Be careful not to exceed 3,000 mg of Tylenol/Acetaminophen in a 24 hour period from all sources.  If you are taking extra strength Tylenol/acetaminophen (500 mg), the maximum dose is 6 tablets in 24 hours.  If you are taking regular strength acetaminophen (325 mg), the maximum dose is 9 tablets in 24 hours.    Call a doctor for any of the following:  Signs of infection (fever, growing tenderness at the surgery site, a large amount of drainage or bleeding, severe pain, foul-smelling drainage, redness, swelling).  It has been over 8 to 10 hours since surgery and you are still not able to urinate (pass water).  Headache for over 24 hours.  Numbness, tingling or weakness the day after surgery (if you had spinal anesthesia).  Signs of Covid-19 infection (temperature over 100 degrees, shortness of breath, cough, loss of taste/smell, generalized body aches, persistent headache, chills, sore throat, nausea/vomiting/diarrhea)  Your doctor is:  Dr. Antelmo Lucia, Ophthalmology: [phone]                    Or dial [phone] and ask for the resident on call for:  Ophthalmology  For emergency care, call the:  [facility]:  [phone] (TTY for hearing impaired: [phone])

## 2023-05-08 ENCOUNTER — OFFICE VISIT (OUTPATIENT)
Dept: OPHTHALMOLOGY | Facility: CLINIC | Age: 63
End: 2023-05-08
Payer: COMMERCIAL

## 2023-05-08 DIAGNOSIS — H02.834 DERMATOCHALASIS OF BOTH UPPER EYELIDS: Primary | ICD-10-CM

## 2023-05-08 DIAGNOSIS — H02.831 DERMATOCHALASIS OF BOTH UPPER EYELIDS: Primary | ICD-10-CM

## 2023-05-08 DIAGNOSIS — Z98.890 POSTOPERATIVE EYE STATE: ICD-10-CM

## 2023-05-08 PROCEDURE — 99024 POSTOP FOLLOW-UP VISIT: CPT | Mod: GC | Performed by: OPHTHALMOLOGY

## 2023-05-08 ASSESSMENT — EXTERNAL EXAM - RIGHT EYE: OD_EXAM: NORMAL

## 2023-05-08 ASSESSMENT — VISUAL ACUITY
OD_SC: 20/30
OS_SC+: -3
METHOD: SNELLEN - LINEAR
OS_SC: 20/25
OD_SC+: -2

## 2023-05-08 ASSESSMENT — TONOMETRY
OD_IOP_MMHG: 15
IOP_METHOD: ICARE
OS_IOP_MMHG: 16

## 2023-05-08 NOTE — PROGRESS NOTES
Chief Complaints and History of Present Illnesses   Patient presents with     Post Op (Ophthalmology) Both Eyes     Pt here for POD#5 s/p BULB, Bilateral lower eyelid steatochalasis and dermatochalasis COSMETIC DOS 05/03/2023.      Chief Complaint(s) and History of Present Illness(es)     Post Op (Ophthalmology) Both Eyes    In both eyes. Additional comments: Pt here for POD#5 s/p BULB, Bilateral   lower eyelid steatochalasis and dermatochalasis COSMETIC DOS 05/03/2023.            Comments    Pt noting improvement. Does not eyes feels scratchy. Pt using areil as   directed.   Pt using:  Erythromycin.   LEIGHA Tong on 5/8/2023 at 11:16 AM         Assessment & Plan     Ryan Wiggins is a 62 year old female with the following diagnoses:   1. Dermatochalasis of both upper eyelids    2. Postoperative eye state         POD5 s/p 4 lid bleph   - healing well, moderate periorbital edema/ecchymosis  * Continue antibiotic ointment or bland lubricating ointment (eg vaseline or aquaphor) to the incision site BID  * Massage along the incision BID  * Warm soaks QID until all edema and ecchymoses resolve  * Return to clinic in 6-8 weeks       Stephanie Austin MD  Oculoplastic Surgery Fellow        Attending Physician Attestation:  I did not see this patient on May 8, 2023, but I have reviewed the relevant history, examination findings, assessment, and plan as documented by others.  I have confirmed and edited as necessary the assessment and plan and agree with this note.  - Antelmo Hughes MD 12:05 PM 5/8/2023

## 2023-05-08 NOTE — NURSING NOTE
Chief Complaints and History of Present Illnesses   Patient presents with     Post Op (Ophthalmology) Both Eyes     Pt here for POD#5 s/p BULB, Bilateral lower eyelid steatochalasis and dermatochalasis COSMETIC DOS 05/03/2023.      Chief Complaint(s) and History of Present Illness(es)     Post Op (Ophthalmology) Both Eyes            Laterality: both eyes    Comments: Pt here for POD#5 s/p BULB, Bilateral lower eyelid steatochalasis and dermatochalasis COSMETIC DOS 05/03/2023.           Comments    Pt noting improvement. Does not eyes feels scratchy. Pt using ariel as directed.   Pt using:  Erythromycin.   LEIGHA Tong on 5/8/2023 at 11:16 AM

## 2023-05-17 ENCOUNTER — TELEPHONE (OUTPATIENT)
Dept: OPHTHALMOLOGY | Facility: CLINIC | Age: 63
End: 2023-05-17
Payer: COMMERCIAL

## 2023-05-17 NOTE — TELEPHONE ENCOUNTER
Called patient to discuss. She is 2 weeks post-op blepharoplasty. Discussed she can restart forehead Botox but to wait at least 6 weeks before doing any treatments around the eye.     Above per Dr. Bullock in Oculo-plastics  Truman Clarke RN 9:07 AM 05/18/23          [facility]    Phone Message    May a detailed message be left on voicemail: yes     Reason for Call: Other: Pt called and would like to know if she will be ok to get a botox injection in her forehead since her procedure. Please contact pt to discuss     Thank you     Action Taken: Message routed to:  [facility] eye    Travel Screening: Not Applicable

## 2023-05-21 ENCOUNTER — HEALTH MAINTENANCE LETTER (OUTPATIENT)
Age: 63
End: 2023-05-21

## 2023-06-13 ENCOUNTER — TELEPHONE (OUTPATIENT)
Dept: OPHTHALMOLOGY | Facility: CLINIC | Age: 63
End: 2023-06-13
Payer: COMMERCIAL

## 2023-06-13 NOTE — TELEPHONE ENCOUNTER
Patient called and left voicemail regarding post-op appointment with Dr. Lucia scheduled 6/19/2023. On detailed VM patient states she would like to call back when she is back in the area to schedule.   Routed to clinic for follow up.       Lesley Canseco on 6/13/2023 at 4:24 PM

## 2023-06-14 ENCOUNTER — TELEPHONE (OUTPATIENT)
Dept: OPHTHALMOLOGY | Facility: CLINIC | Age: 63
End: 2023-06-14
Payer: COMMERCIAL

## 2023-06-14 NOTE — TELEPHONE ENCOUNTER
Spoke with patent regarding request to reschedule POST-OP. Informed patient of POST-OP needed to be scheduled within 90 Days from Surgery Date. Rescheduled patient accordingly for a later date before 90 Days from Surgery Date. Sent reminder letter to confirmed address.-Per PT

## 2023-07-24 ENCOUNTER — TELEPHONE (OUTPATIENT)
Dept: OPHTHALMOLOGY | Facility: CLINIC | Age: 63
End: 2023-07-24
Payer: COMMERCIAL

## 2023-07-24 NOTE — TELEPHONE ENCOUNTER
Received voicemail from patient on 7/24/23 regarding scheduled appointment with Dr. Lucia.    Patient stated that they wanted to cancel post op scheduled on 7/31 and that she would like the [facility] to call her back. ([phone])    Appointment has been cancelled.   __    Kirsten Carmichael, on 7/24/2023  P: [phone]

## 2023-07-24 NOTE — TELEPHONE ENCOUNTER
Pt send question via [employer], will address there.     Kelsea LOMBARDO July 24, 2023 11:52 AM

## 2024-07-28 ENCOUNTER — HEALTH MAINTENANCE LETTER (OUTPATIENT)
Age: 64
End: 2024-07-28

## 2025-02-16 ENCOUNTER — HEALTH MAINTENANCE LETTER (OUTPATIENT)
Age: 65
End: 2025-02-16

## 2025-07-20 ENCOUNTER — HEALTH MAINTENANCE LETTER (OUTPATIENT)
Age: 65
End: 2025-07-20

## (undated) DEVICE — EYE PREP BETADINE 5% SOLUTION 30ML 0065-0411-30

## (undated) DEVICE — ESU EYE HIGH TEMP 65410-183

## (undated) DEVICE — LINEN TOWEL PACK X5 5464

## (undated) DEVICE — PACK MINOR EYE CUSTOM ASC

## (undated) DEVICE — GLOVE BIOGEL PI MICRO SZ 7.5 48575

## (undated) DEVICE — SOL WATER IRRIG 500ML BOTTLE 2F7113

## (undated) RX ORDER — ACETAMINOPHEN 325 MG/1
TABLET ORAL
Status: DISPENSED
Start: 2023-05-03

## (undated) RX ORDER — FENTANYL CITRATE 50 UG/ML
INJECTION, SOLUTION INTRAMUSCULAR; INTRAVENOUS
Status: DISPENSED
Start: 2023-05-03

## (undated) RX ORDER — OXYCODONE HYDROCHLORIDE 5 MG/1
TABLET ORAL
Status: DISPENSED
Start: 2023-05-03

## (undated) RX ORDER — HYDROMORPHONE HYDROCHLORIDE 1 MG/ML
INJECTION, SOLUTION INTRAMUSCULAR; INTRAVENOUS; SUBCUTANEOUS
Status: DISPENSED
Start: 2023-05-03